# Patient Record
Sex: FEMALE | Race: WHITE | NOT HISPANIC OR LATINO | Employment: UNEMPLOYED | URBAN - METROPOLITAN AREA
[De-identification: names, ages, dates, MRNs, and addresses within clinical notes are randomized per-mention and may not be internally consistent; named-entity substitution may affect disease eponyms.]

---

## 2024-04-09 ENCOUNTER — OFFICE VISIT (OUTPATIENT)
Dept: URGENT CARE | Facility: CLINIC | Age: 44
End: 2024-04-09
Payer: COMMERCIAL

## 2024-04-09 VITALS
OXYGEN SATURATION: 97 % | SYSTOLIC BLOOD PRESSURE: 144 MMHG | WEIGHT: 125 LBS | BODY MASS INDEX: 19.62 KG/M2 | HEIGHT: 67 IN | DIASTOLIC BLOOD PRESSURE: 83 MMHG | HEART RATE: 74 BPM | TEMPERATURE: 98 F | RESPIRATION RATE: 16 BRPM

## 2024-04-09 DIAGNOSIS — T23.212A PARTIAL THICKNESS BURN OF LEFT THUMB, INITIAL ENCOUNTER: Primary | ICD-10-CM

## 2024-04-09 PROCEDURE — 99203 OFFICE O/P NEW LOW 30 MIN: CPT | Mod: S$GLB,,, | Performed by: NURSE PRACTITIONER

## 2024-04-09 RX ORDER — SILVER SULFADIAZINE 10 G/1000G
CREAM TOPICAL 2 TIMES DAILY
Qty: 50 G | Refills: 1 | Status: SHIPPED | OUTPATIENT
Start: 2024-04-09

## 2024-04-09 NOTE — PATIENT INSTRUCTIONS
Apply to a thickness of 1/16 inch once or twice daily; reapply as needed to areas where the cream is removed by patient activity as the burned area should be covered with cream at all times. Continue use until burn has healed.    Follow up closely with your Primary Care Provider for recheck.   Return here or go to the ER as needed for signs of infection.  Okay to take otc Tylenol or Ibuprofen as directed as needed for pain control.  Wash area with a antibacterial soap such as Hibiclens.

## 2024-04-09 NOTE — PROGRESS NOTES
"Subjective:      Patient ID: Tara Gutierrez is a 44 y.o. female.    Vitals:  height is 5' 7" (1.702 m) and weight is 56.7 kg (125 lb). Her oral temperature is 98.1 °F (36.7 °C). Her blood pressure is 144/83 (abnormal) and her pulse is 74. Her respiration is 16 and oxygen saturation is 97%.     Chief Complaint: Burn    Pt states she burned her left thumb an hr ago. Pt states she was pouring soup for herself at [employer].  She was given a burn cream by the staff and she bought an aloe plant to put on the burn.    Burn  The incident occurred less than 1 hour ago. The burns occurred outside. The burns occurred while cooking. The burns were a result of contact with a hot liquid. The burns are located on the left hand. The pain is at a severity of 10/10. The pain is severe. The treatment provided no relief.       Constitution: Negative for chills, fatigue and fever.   Musculoskeletal:  Positive for pain. Negative for joint swelling and abnormal ROM of joint.   Skin:  Positive for wound. Negative for erythema.      Objective:     Physical Exam   Constitutional: She is oriented to person, place, and time. She appears well-developed.  Non-toxic appearance. She does not appear ill. No distress.   HENT:   Head: Normocephalic and atraumatic. Head is without abrasion, without contusion and without laceration.   Ears:   Right Ear: External ear normal.   Left Ear: External ear normal.   Nose: Nose normal.   Mouth/Throat: Oropharynx is clear and moist and mucous membranes are normal.   Eyes: Conjunctivae, EOM and lids are normal. Pupils are equal, round, and reactive to light.   Neck: Trachea normal and phonation normal. Neck supple.   Cardiovascular: Normal rate, regular rhythm and normal heart sounds.   Pulmonary/Chest: Effort normal and breath sounds normal. No stridor. No respiratory distress.   Musculoskeletal: Normal range of motion.         General: Normal range of motion.   Neurological: She is alert and oriented to " person, place, and time.   Skin: Skin is warm, dry, intact, not diaphoretic and no rash. Capillary refill takes less than 2 seconds. burn (2nd degree burn to dorsum of left hand at the thumb, full ROM) No abrasion, No bruising, No erythema and No ecchymosis   Psychiatric: Her speech is normal and behavior is normal. Judgment and thought content normal.   Nursing note and vitals reviewed.      Assessment:     1. Partial thickness burn of left thumb, initial encounter        Plan:     Recommended tetanus vaccine to be updated today, she declined this.    Wound care performed by MA.  Silvadene and non stick dressing placed.    Partial thickness burn of left thumb, initial encounter  -     silver sulfADIAZINE 1% (SILVADENE) 1 % cream; Apply topically 2 (two) times daily. Continue use until healing has occured  Dispense: 50 g; Refill: 1      Patient Instructions   Apply to a thickness of 1/16 inch once or twice daily; reapply as needed to areas where the cream is removed by patient activity as the burned area should be covered with cream at all times. Continue use until burn has healed.    Follow up closely with your Primary Care Provider for recheck.   Return here or go to the ER as needed for signs of infection.  Okay to take otc Tylenol or Ibuprofen as directed as needed for pain control.  Wash area with a antibacterial soap such as Hibiclens.

## 2024-05-07 ENCOUNTER — OFFICE VISIT (OUTPATIENT)
Dept: URGENT CARE | Facility: CLINIC | Age: 44
End: 2024-05-07
Payer: COMMERCIAL

## 2024-05-07 VITALS
HEART RATE: 70 BPM | DIASTOLIC BLOOD PRESSURE: 74 MMHG | OXYGEN SATURATION: 100 % | HEIGHT: 67 IN | BODY MASS INDEX: 19.62 KG/M2 | RESPIRATION RATE: 16 BRPM | TEMPERATURE: 98 F | SYSTOLIC BLOOD PRESSURE: 110 MMHG | WEIGHT: 125 LBS

## 2024-05-07 DIAGNOSIS — H92.02 OTALGIA OF LEFT EAR: Primary | ICD-10-CM

## 2024-05-07 PROCEDURE — 99213 OFFICE O/P EST LOW 20 MIN: CPT | Mod: S$GLB,,, | Performed by: FAMILY MEDICINE

## 2024-05-07 NOTE — PATIENT INSTRUCTIONS
Tylenol or ibuprofen for pain  Monitor for signs of new or worsening symptoms  Follow-up with ENT    You must understand that you have received treatment at an Urgent Care facility only, and that you may be  released before all of your medical problems are known or treated. Urgent Care facilities are not equipped to  handle life threatening emergencies. It is recommended that you seek care at an Emergency Department for  further evaluation of worsening or concerning symptoms, or possibly life threatening conditions as  discussed.      If you develop chest pain, shortness of breath, throat swelling, tongue swelling, lightheadedness or any other causes for concern, proceed to ER.

## 2024-05-07 NOTE — PROGRESS NOTES
"Subjective:      Patient ID: Tara Gutierrez is a 44 y.o. female.    Vitals:  height is 5' 7" (1.702 m) and weight is 56.7 kg (125 lb). Her oral temperature is 98 °F (36.7 °C). Her blood pressure is 110/74 and her pulse is 70. Her respiration is 16 and oxygen saturation is 100%.     Chief Complaint: Otalgia    Left ear pain x 2 days  Denies any meds for ear pain   Denies fever or any other symptoms     Pt is a 43 yo F who presents with left ear for 2 days.  She denies, fever, sore throat, headache, nasal congestion, sinus pressure or cough.  Patient denies getting water in her ear. Denies muffled hearing.  Reports that the pain is dull.  Denies any mouth or tooth pain.    Otalgia   There is pain in the left ear. This is a new problem. The current episode started in the past 7 days. The problem has been waxing and waning. There has been no fever. The patient is experiencing no pain. She has tried nothing for the symptoms. The treatment provided no relief.       HENT:  Positive for ear pain.       Objective:     Physical Exam   Constitutional: She is oriented to person, place, and time. She appears well-developed. She is cooperative.  Non-toxic appearance. She does not appear ill. No distress.   HENT:   Head: Normocephalic and atraumatic.   Ears:   Right Ear: Hearing, tympanic membrane, external ear and ear canal normal.   Left Ear: Hearing, tympanic membrane, external ear and ear canal normal.   Nose: Nose normal. No mucosal edema, rhinorrhea or nasal deformity. No epistaxis. Right sinus exhibits no maxillary sinus tenderness and no frontal sinus tenderness. Left sinus exhibits no maxillary sinus tenderness and no frontal sinus tenderness.   Mouth/Throat: Uvula is midline, oropharynx is clear and moist and mucous membranes are normal. No trismus in the jaw. Normal dentition. No uvula swelling. No oropharyngeal exudate, posterior oropharyngeal edema or posterior oropharyngeal erythema.   Eyes: Conjunctivae and lids " are normal. No scleral icterus.   Neck: Trachea normal and phonation normal. Neck supple. No edema present. No erythema present. No neck rigidity present.   Cardiovascular: Normal rate, regular rhythm, normal heart sounds and normal pulses.   Pulmonary/Chest: Effort normal and breath sounds normal. No respiratory distress. She has no decreased breath sounds. She has no rhonchi.   Abdominal: Normal appearance.   Musculoskeletal: Normal range of motion.         General: No deformity. Normal range of motion.   Lymphadenopathy:     She has no cervical adenopathy.   Neurological: She is alert and oriented to person, place, and time. She exhibits normal muscle tone. Coordination normal.   Skin: Skin is warm, dry, intact, not diaphoretic and not pale.   Psychiatric: Her speech is normal and behavior is normal. Judgment and thought content normal.   Nursing note and vitals reviewed.      Assessment:     1. Otalgia of left ear        Plan:       Otalgia of left ear    Physical exam of left ear is normal.  Pharynx and mouth are normal. Nasal passage is normal.  Patient advised to take motrin or tylenol as needed for pain.  Monitor for new or worsening symptoms. Follow-up with ENT            Patient Instructions   Tylenol or ibuprofen for pain  Monitor for signs of new or worsening symptoms  Follow-up with ENT    You must understand that you have received treatment at an Urgent Care facility only, and that you may be  released before all of your medical problems are known or treated. Urgent Care facilities are not equipped to  handle life threatening emergencies. It is recommended that you seek care at an Emergency Department for  further evaluation of worsening or concerning symptoms, or possibly life threatening conditions as  discussed.      If you develop chest pain, shortness of breath, throat swelling, tongue swelling, lightheadedness or any other causes for concern, proceed to ER.